# Patient Record
Sex: FEMALE | URBAN - METROPOLITAN AREA
[De-identification: names, ages, dates, MRNs, and addresses within clinical notes are randomized per-mention and may not be internally consistent; named-entity substitution may affect disease eponyms.]

---
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
## 2023-12-31 PROBLEM — Z11.3 ENCOUNTER FOR SCREENING EXAMINATION FOR SEXUALLY TRANSMITTED DISEASE: Status: RESOLVED | Noted: 2018-10-18 | Resolved: 2020-12-21

## 2024-03-04 NOTE — DISCUSSION/SUMMARY
Give Coreg and recheck in one hour.
[FreeTextEntry1] : \par discussed folowing with mother who came with pt\par Needs menactra vaccination.  will check to see if pt had already\par Anemia screening done - hgb 11.4  rec mtv with iron daily and iron rich foods. \par myopia bilat- referral for eyeglasses given\par Counseled regarding dental hygiene, seatbelt safety, Healthy Lifestyle 5210, and healthy relationships.\par Routine dental care.\par will fax copy of CPE to FDA\par \par \par 
PA aware.  Will consult with attending and order meds as needed